# Patient Record
Sex: FEMALE | Race: WHITE | URBAN - METROPOLITAN AREA
[De-identification: names, ages, dates, MRNs, and addresses within clinical notes are randomized per-mention and may not be internally consistent; named-entity substitution may affect disease eponyms.]

---

## 2023-01-01 ENCOUNTER — TELEPHONE ENCOUNTER (OUTPATIENT)
Dept: URBAN - METROPOLITAN AREA CLINIC 92 | Facility: CLINIC | Age: 78
End: 2023-01-01

## 2023-01-01 ENCOUNTER — CLAIMS CREATED FROM THE CLAIM WINDOW (OUTPATIENT)
Dept: URBAN - METROPOLITAN AREA MEDICAL CENTER 8 | Facility: MEDICAL CENTER | Age: 78
End: 2023-01-01
Payer: MEDICARE

## 2023-01-01 ENCOUNTER — P2P PATIENT RECORD (OUTPATIENT)
Age: 78
End: 2023-01-01

## 2023-01-01 ENCOUNTER — DASHBOARD ENCOUNTERS (OUTPATIENT)
Age: 78
End: 2023-01-01

## 2023-01-01 ENCOUNTER — CLAIMS CREATED FROM THE CLAIM WINDOW (OUTPATIENT)
Dept: URBAN - METROPOLITAN AREA MEDICAL CENTER 8 | Facility: MEDICAL CENTER | Age: 78
End: 2023-01-01

## 2023-01-01 ENCOUNTER — WEB ENCOUNTER (OUTPATIENT)
Dept: URBAN - METROPOLITAN AREA CLINIC 92 | Facility: CLINIC | Age: 78
End: 2023-01-01

## 2023-01-01 ENCOUNTER — OFFICE VISIT (OUTPATIENT)
Dept: URBAN - METROPOLITAN AREA CLINIC 92 | Facility: CLINIC | Age: 78
End: 2023-01-01

## 2023-01-01 ENCOUNTER — OFFICE VISIT (OUTPATIENT)
Dept: URBAN - METROPOLITAN AREA MEDICAL CENTER 28 | Facility: MEDICAL CENTER | Age: 78
End: 2023-01-01
Payer: MEDICARE

## 2023-01-01 ENCOUNTER — OFFICE VISIT (OUTPATIENT)
Dept: URBAN - METROPOLITAN AREA CLINIC 92 | Facility: CLINIC | Age: 78
End: 2023-01-01
Payer: MEDICARE

## 2023-01-01 VITALS
HEIGHT: 63 IN | SYSTOLIC BLOOD PRESSURE: 117 MMHG | TEMPERATURE: 97.1 F | BODY MASS INDEX: 19.14 KG/M2 | WEIGHT: 108 LBS | HEART RATE: 74 BPM | DIASTOLIC BLOOD PRESSURE: 70 MMHG

## 2023-01-01 VITALS
TEMPERATURE: 97 F | BODY MASS INDEX: 15.77 KG/M2 | HEIGHT: 63 IN | WEIGHT: 89 LBS | HEART RATE: 65 BPM | SYSTOLIC BLOOD PRESSURE: 157 MMHG | DIASTOLIC BLOOD PRESSURE: 68 MMHG

## 2023-01-01 VITALS
WEIGHT: 98.4 LBS | HEART RATE: 102 BPM | DIASTOLIC BLOOD PRESSURE: 65 MMHG | HEIGHT: 63 IN | TEMPERATURE: 97.1 F | SYSTOLIC BLOOD PRESSURE: 101 MMHG | BODY MASS INDEX: 17.43 KG/M2

## 2023-01-01 DIAGNOSIS — K31.5 DUODENAL OBSTRUCTION: ICD-10-CM

## 2023-01-01 DIAGNOSIS — R10.84 GENERALIZED ABDOMINAL PAIN: ICD-10-CM

## 2023-01-01 DIAGNOSIS — K85.80 ACUTE VIRAL PANCREATITIS: ICD-10-CM

## 2023-01-01 DIAGNOSIS — K85.20 ACUTE ALCOHOLIC PANCREATITIS: ICD-10-CM

## 2023-01-01 DIAGNOSIS — K86.1 CHRONIC PANCREATITIS, UNSPECIFIED PANCREATITIS TYPE: ICD-10-CM

## 2023-01-01 DIAGNOSIS — K22.89 DILATATION OF ESOPHAGUS: ICD-10-CM

## 2023-01-01 DIAGNOSIS — K83.1 AMPULLA OF VATER OBSTRUCTION SYNDROME: ICD-10-CM

## 2023-01-01 DIAGNOSIS — R19.8 CHANGE IN BOWEL MOVEMENT: ICD-10-CM

## 2023-01-01 DIAGNOSIS — R10.13 ABDOMINAL DISCOMFORT, EPIGASTRIC: ICD-10-CM

## 2023-01-01 DIAGNOSIS — K31.1 ACQUIRED GASTRIC OUTLET STENOSIS: ICD-10-CM

## 2023-01-01 DIAGNOSIS — Z85.3 HISTORY OF BREAST CANCER: ICD-10-CM

## 2023-01-01 DIAGNOSIS — R11.2 NAUSEA AND VOMITING, UNSPECIFIED VOMITING TYPE: ICD-10-CM

## 2023-01-01 DIAGNOSIS — K59.09 CHANGE IN BOWEL MOVEMENTS INTERMITTENT CONSTIPATION. URGENCY IN THE MORNING.: ICD-10-CM

## 2023-01-01 DIAGNOSIS — K29.80 ACUTE DUODENITIS: ICD-10-CM

## 2023-01-01 DIAGNOSIS — K29.50 ANTRAL GASTRITIS: ICD-10-CM

## 2023-01-01 DIAGNOSIS — K86.1 ACUTE ON CHRONIC PANCREATITIS: ICD-10-CM

## 2023-01-01 DIAGNOSIS — R74.8 ABNORMAL ALKALINE PHOSPHATASE TEST: ICD-10-CM

## 2023-01-01 DIAGNOSIS — R93.3 ABN FINDINGS-GI TRACT: ICD-10-CM

## 2023-01-01 DIAGNOSIS — K31.89 ACQUIRED DEFORMITY OF DUODENUM: ICD-10-CM

## 2023-01-01 DIAGNOSIS — K86.89 PANCREATIC DUCT DILATED: ICD-10-CM

## 2023-01-01 DIAGNOSIS — K86.89 ACUTE PANCREATIC FLUID COLLECTION: ICD-10-CM

## 2023-01-01 PROCEDURE — 99233 SBSQ HOSP IP/OBS HIGH 50: CPT | Performed by: INTERNAL MEDICINE

## 2023-01-01 PROCEDURE — 99232 SBSQ HOSP IP/OBS MODERATE 35: CPT | Performed by: PHYSICIAN ASSISTANT

## 2023-01-01 PROCEDURE — 99232 SBSQ HOSP IP/OBS MODERATE 35: CPT | Performed by: INTERNAL MEDICINE

## 2023-01-01 PROCEDURE — 43242 EGD US FINE NEEDLE BX/ASPIR: CPT | Performed by: INTERNAL MEDICINE

## 2023-01-01 PROCEDURE — G8427 DOCREV CUR MEDS BY ELIG CLIN: HCPCS | Performed by: INTERNAL MEDICINE

## 2023-01-01 PROCEDURE — 99204 OFFICE O/P NEW MOD 45 MIN: CPT

## 2023-01-01 PROCEDURE — G8420 CALC BMI NORM PARAMETERS: HCPCS | Performed by: INTERNAL MEDICINE

## 2023-01-01 PROCEDURE — 99214 OFFICE O/P EST MOD 30 MIN: CPT | Performed by: INTERNAL MEDICINE

## 2023-01-01 PROCEDURE — 99223 1ST HOSP IP/OBS HIGH 75: CPT | Performed by: INTERNAL MEDICINE

## 2023-01-01 PROCEDURE — 99222 1ST HOSP IP/OBS MODERATE 55: CPT | Performed by: INTERNAL MEDICINE

## 2023-01-01 PROCEDURE — 43239 EGD BIOPSY SINGLE/MULTIPLE: CPT | Performed by: INTERNAL MEDICINE

## 2023-01-01 RX ORDER — METOCLOPRAMIDE 10 MG/1
1 TABLET BEFORE MEALS TABLET ORAL TWICE A DAY
Status: ACTIVE | COMMUNITY

## 2023-01-01 RX ORDER — OXYCODONE HYDROCHLORIDE AND ACETAMINOPHEN 7.5; 325 MG/1; MG/1
1 TABLET AS NEEDED TABLET ORAL
Status: ACTIVE | COMMUNITY

## 2023-01-01 RX ORDER — ANASTROZOLE 1 MG/1
1 TABLET TABLET, FILM COATED ORAL ONCE A DAY
Status: ACTIVE | COMMUNITY

## 2023-01-01 RX ORDER — ESZOPICLONE 3 MG/1
1 TABLET IMMEDIATELY BEFORE BEDTIME TABLET, FILM COATED ORAL ONCE A DAY
Status: ACTIVE | COMMUNITY

## 2023-01-01 RX ORDER — PANTOPRAZOLE SODIUM 40 MG/1
1 TABLET TABLET, DELAYED RELEASE ORAL ONCE A DAY
Status: ACTIVE | COMMUNITY

## 2023-01-01 RX ORDER — PANCRELIPASE 36000; 180000; 114000 [USP'U]/1; [USP'U]/1; [USP'U]/1
TWO TABLETS WITH MEALS AND ONE TABLET WITH SNACKS CAPSULE, DELAYED RELEASE PELLETS ORAL
Qty: 600 | Refills: 3 | OUTPATIENT
Start: 2023-01-01 | End: 2024-03-04

## 2023-01-01 RX ORDER — OMEPRAZOLE MAGNESIUM 10 MG/1
1 PACKET MIXED WITH 15 ML OF WATER GRANULE, DELAYED RELEASE ORAL ONCE A DAY
Status: ACTIVE | COMMUNITY

## 2023-01-01 RX ORDER — MORPHINE SULFATE 15 MG/1
1 TABLET AS NEEDED TABLET ORAL
Status: ACTIVE | COMMUNITY

## 2023-01-01 RX ORDER — DICYCLOMINE HYDROCHLORIDE 10 MG/1
2 CAPSULES CAPSULE ORAL THREE TIMES A DAY
Status: DISCONTINUED | COMMUNITY

## 2023-01-01 RX ORDER — PANCRELIPASE 36000; 180000; 114000 [USP'U]/1; [USP'U]/1; [USP'U]/1
TWO TABLETS WITH MEALS AND ONE TABLET WITH SNACKS CAPSULE, DELAYED RELEASE PELLETS ORAL
Qty: 600 | Refills: 3 | Status: DISCONTINUED | COMMUNITY
Start: 2023-01-01 | End: 2024-03-04

## 2023-01-01 RX ORDER — DICYCLOMINE HYDROCHLORIDE 10 MG/1
2 CAPSULES CAPSULE ORAL THREE TIMES A DAY
Status: ACTIVE | COMMUNITY

## 2023-01-01 RX ORDER — PROMETHAZINE HYDROCHLORIDE 25 MG/1
1 TABLET AS NEEDED TABLET ORAL
Qty: 120 TABLET | Refills: 0 | OUTPATIENT
Start: 2023-01-01 | End: 2023-01-01

## 2023-01-01 RX ORDER — PANCRELIPASE LIPASE, PANCRELIPASE PROTEASE, PANCRELIPASE AMYLASE 40000; 126000; 168000 [USP'U]/1; [USP'U]/1; [USP'U]/1
2 TABLETS WITH MEALS AND 1 TABLET WITH SNACK CAPSULE, DELAYED RELEASE ORAL
Qty: 600 | Refills: 3 | OUTPATIENT
Start: 2023-01-01

## 2023-02-03 PROBLEM — 429087003: Status: ACTIVE | Noted: 2023-01-01

## 2023-02-03 NOTE — HPI-TODAY'S VISIT:
78-year-old female presents today for abnormal CT scan.  She was sent to us by [place] [facility] specialist a copy of this note be sent to referring provider.  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 When she was having her semi annual visit with her oncologit she was c/o abd pain so a CT scan was ordered.  CT scan showed interval development of pancreatic duct dilation measuring up to 7 mm in the head and neck there is also some mild heterogeneous hypodensity within the surrounding pancreatic head and there is also circumferential enhancement of the common bile duct which is not dilated.  The pancreas has normal appearance on the prior CT in June without pancreatic duct dilation.  Findings could represent sequela of pancreatitis with some mass-effect or stricturing causing the pancreatic duct dilation and some reactive enhancement of the common bile duct although the possibility of loculated pancreatic head malignancy is not excluded recommend clinical correlation and MRI pancreas protocol.  Some adjacent peripancreatic lymph nodes could be reactive or metastatic depending on underlying diagnosis.  No additional developing acute findings in chest abdomen or pelvis.  Some scattered subpleural nodular opacities in both lungs favored to be atelectasis but recommended attention to follow-up. Labs were obtained on 1- which showed Bili 0.4, alk phos 52, ALT 12, AST 16, lipase 102 H LDH 278H, amylase 97N.   Currently states that she has been having increased generalized abdominal pain that started back in October 2022.  She states the pain occurs all day every day and sometimes radiates to her back.  She was given Oxycodone by her oncologist which does help.  She also has random bouts of nausea occasionally.  Denies vomiting, GERD symptoms, dysphagia, odynophagia, NSAID use. She was given Prilosec twice daily empirically to see if this works which  does not make the pain completely go away but if she comes off of it she does feel worse. She also notes she has been having increased constipation that started around the same time and she is currently on lactulose which does help her bowel movements.  She does note blood on the toilet paper when she wipes.  Denies melena.  She also states she has lost 15 pounds since September.  Her last colonoscopy was 2017 and this was normal per patient she has no history of colon polyps.  She denies any family history of any GI related cancers. She quit tobacco in 1980s and states she was a smoker for about 15 years 2 packs/day.  She also was a heavy alcohol drinker and has been drinking 2 to 3 glasses of vodka or wine daily for years.  Since the pain however she has stopped this.  She did use marijuana when she was a teenager but otherwise no illicit drug use.

## 2023-03-10 PROBLEM — 235494005: Status: ACTIVE | Noted: 2023-01-01

## 2023-03-10 NOTE — HPI-TODAY'S VISIT:
78-year-old female presents  for follow-up.    She has a history of left breast carcinoma stage I receptor positive dx in 2014 who underwent bilateral mastectomy and is currently on Arimidex.   She had vague abdominal pain and underwent CT of the abdomen pelvis that showed and interval development of pancreatic duct dilation measuring up to 7 mm in the head and neck there is also some mild heterogeneous hypodensity within the surrounding pancreatic head and there is also circumferential enhancement of the common bile duct which is not dilated.  Labs were obtained on 01/26/2023 which showed Bili 0.4, alk phos 52, ALT 12, AST 16, lipase 102 H LDH 278H, amylase 97N.   She underwent EUS / FNA by Dr. Arnold Peng on 02/23/2023 that demonstrated moderate to severe chronic pancreatitis with hyperechoic strands, hyperechoic foci and lobularity.  The pancreatic duct was dilated to 11 mm.  There was a cystic mass in the pancreatic head measuring 1.6 cm x 1.1 cm.  Cyst aspiration was performed and cytopathology showed marked hypercellular aspirated with inflammatory cells.  CEA was 89 and amylase was 42,133.  She has a longstanding history of alcohol abuse.  Patient started drinking in her 30s and currently drinks half a bottle to a bottle of liquor a day.  She has never had chronic pancreatitis in the past.   Patient reports having pain after the procedure which is now resolved with dicyclomine and Protonix.

## 2023-07-07 NOTE — HPI-TODAY'S VISIT:
78-year-old female presents  for follow-up after recent hospitalization.    She has a history of left breast carcinoma stage I receptor positive dx in 2014 who underwent bilateral mastectomy and is currently on Arimidex.   She has chronic alcoholic pancreatitis and was recently hospitalized at [facility] on 06/26/2023 to 07/02/2023  With acute pancreatitis.  She underwent CT of the abdomen and pelvis on 06/28 that demonstrated dilated stomach with gastric pneumatosis.  There is a 21.5 cm low attenuated lesion in the pancreas consistent with pancreatic cystic lesions.  There is marked dilated main pancreatic duct with suspected pancreatic divisum but underlying obstructing lesion cannot be excluded.  There was segmental wall thickening of the sigmoid colon.  She is currently pending repeat MRI/MRCP.  Since her discharge, patient reports having continued persistent abdominal pain associated with bloating.  She has infrequent bowel movements which is improved with prune juice.  She has intractable nausea and vomiting despite Zofran.  She is using oxycodone to control her abdominal pain.  She has continued unintentional weight loss  But has not been taking Creon.  Patient was initially referred for abnormal imaging with CT of the abdomen pelvis that showed and interval development of pancreatic duct dilation measuring up to 7 mm in the head and neck there is also some mild heterogeneous hypodensity within the surrounding pancreatic head and there is also circumferential enhancement of the common bile duct which is not dilated.  Labs were obtained on 01/26/2023 which showed Bili 0.4, alk phos 52, ALT 12, AST 16, lipase 102 H LDH 278H, amylase 97N. She underwent EUS / FNA by Dr. Arnold Peng on 02/23/2023 that demonstrated moderate to severe chronic pancreatitis with hyperechoic strands, hyperechoic foci and lobularity.  The pancreatic duct was dilated to 11 mm.  There was a cystic mass in the pancreatic head measuring 1.6 cm x 1.1 cm.  Cyst aspiration was performed and cytopathology showed marked hypercellular aspirated with inflammatory cells.  CEA was 89 and amylase was 42,133. she had MRI/ MRCP on 05/30/2023 done showed pancreatic ductal dilatation and had now measuring 1.0 cm with abrupt pancreatic duct stricture with cystic lesion measuring 3.0 is cm by 2.3 cm.  There additional cystic lesions with dilated side branches.  She has a longstanding history of alcohol abuse.  Patient started drinking in her 30s and currently  has been abstinent from alcohol since February 2023.

## 2023-09-15 ENCOUNTER — OFFICE VISIT (OUTPATIENT)
Dept: URBAN - METROPOLITAN AREA MEDICAL CENTER 28 | Facility: MEDICAL CENTER | Age: 78
End: 2023-09-15